# Patient Record
Sex: FEMALE | Race: WHITE | Employment: FULL TIME | URBAN - METROPOLITAN AREA
[De-identification: names, ages, dates, MRNs, and addresses within clinical notes are randomized per-mention and may not be internally consistent; named-entity substitution may affect disease eponyms.]

---
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
## 2021-05-30 VITALS — WEIGHT: 121.25 LBS | BODY MASS INDEX: 20.7 KG/M2 | HEIGHT: 64 IN

## 2021-06-08 NOTE — PROGRESS NOTES
Assessment/Plan:     Health maintenance female exam.  All questions answered.  Breast self exam technique reviewed and patient encouraged to perform self-exam monthly.  Discussed healthy lifestyle modifications.      She will follow up with one year or if there are concerns.  I did encourage her to call me if she decides she would like to start birth control pills we discussed the risks and benefits of those today.  She is not currently sexually active.        Subjective:      Galilea Gonzales is a 17 y.o. female who presents for an annual exam.  She is overall doing very well.  She is a senior in high school but is taking most of her classes at [employer] currently.  She plans on being a .  She will be going to college in [place] next year.    There are no diagnoses linked to this encounter.    Healthy Habits:   Regular Exercise: Yes  Sunscreen Use: Yes  Healthy Diet: Yes  Dental Visits Regularly: Yes  Seat Belt: Yes  Sexually active: No  Self Breast Exam Monthly:No  Colonoscopy: N/A  Prevention of Osteoporosis: N/A  Last Dexa: N/A    Immunization History   Administered Date(s) Administered     DTaP, historic 1999, 1999, 1999, 04/26/2000, 01/29/2004     HPV Quadrivalent 03/09/2010, 05/18/2010, 11/30/2010     Hep A, historic 02/21/2008, 11/01/2008     Hep B, historic 1999, 1999, 1999, 04/26/2000     HiB, historic 1999, 1999, 04/26/2000     IPV 1999, 1999, 01/31/2000, 03/19/2003     Influenza [id number], 11/11/2009     Influenza, inj, historic 11/08/2002, 12/09/2002, 12/04/2003, 10/23/2005, 10/20/2006, 10/25/2007, 11/01/2008, 11/27/2009, 11/30/2010, 01/16/2012     Influenza, seasonal,quad inj 6-35 mos 11/17/2012, 11/09/2013     Influenza,live, Nasal Laiv4 01/20/2015     Influenza,seasonal quad, PF, 36+MOS 01/29/2016     MMR 01/31/2000, 01/29/2004     Meningococcal MCV4P 03/09/2010, 01/29/2016     Rotavirus, historic 1999     Td,  "historic 03/09/2010     Tdap 03/09/2010     Varicella 01/31/2000, 02/21/2008     Immunization status: up to date and documented.    Gynecologic History  Patient's last menstrual period was 01/20/2017 (within days).  Contraception: abstinence      OB History   No data available       No current outpatient prescriptions on file.     No current facility-administered medications for this visit.      History reviewed. No pertinent past medical history.  History reviewed. No pertinent surgical history.  No known drug allergies  Family History   Problem Relation Age of Onset     No Medical Problems Mother      No Medical Problems Father      No Medical Problems Sister      No Medical Problems Brother      Lymphoma Maternal Grandfather      Social History     Social History     Marital status: Single     Spouse name: N/A     Number of children: N/A     Years of education: N/A     Occupational History     Not on file.     Social History Main Topics     Smoking status: Never Smoker     Smokeless tobacco: Not on file     Alcohol use No     Drug use: Not on file     Sexual activity: No     Other Topics Concern     Not on file     Social History Narrative       Review of Systems  General:  Denies problems  Eyes:  Denies problems  Ears/Nose/Throat:  Denies problems  Cardiovascular:  Denies problems  Respiratory:  Denies problems  Gastrointestinal:  Denies problems  Genitourinary:  Denies problems  Musculoskeletal:  Denies problems  Skin:  Denies problems, Neurologic:  Denies problems  Psychiatric:  Denies problems  Endocrine:  Denies problems  Heme/Lymphatic:  Denies problems  Allergic/Immunologic:  Denies problems       Objective:         Vitals:    02/10/17 1550   BP: 98/64   Pulse: 68   Resp: 16   Temp: 98.5  F (36.9  C)   TempSrc: Oral   Weight: 121 lb 4 oz (55 kg)   Height: 5' 4\" (1.626 m)       Physical Exam:  General Appearance: Alert, cooperative, no distress, appears stated age   Head: Normocephalic, without obvious " abnormality, atraumatic  Eyes: PERRL, conjunctiva/corneas clear, EOM's intact   Ears: Normal TM's and external ear canals, both ears  Nose:Nares normal, septum midline,mucosa normal, no drainage    Throat:Lips, mucosa, and tongue normal; teeth and gums normal  Neck: Supple, symmetrical, trachea midline, no adenopathy;  thyroid: not enlarged, symmetric, no tenderness/mass/nodules  Back: Symmetric, no curvature, ROM normal,  Lungs: Clear to auscultation bilaterally, respirations unlabored  Heart: Regular rate and rhythm, S1 and S2 normal, no murmur, rub, or gallop  Abdomen: Soft, non-tender, bowel sounds active all four quadrants,  no masses, no organomegaly  Pelvic: Deferred per patient preference  Extremities: Extremities normal, atraumatic, no cyanosis or edema  Skin: Skin color, texture, turgor normal, no rashes or lesions  Lymph nodes: Cervical, supraclavicular, and axillary nodes normal and   Neurologic: Normal

## 2024-01-27 ENCOUNTER — HOSPITAL ENCOUNTER (OUTPATIENT)
Facility: CLINIC | Age: 24
Setting detail: OBSERVATION
Discharge: HOME | End: 2024-01-28
Attending: EMERGENCY MEDICINE | Admitting: STUDENT IN AN ORGANIZED HEALTH CARE EDUCATION/TRAINING PROGRAM
Payer: OTHER MISCELLANEOUS

## 2024-01-27 ENCOUNTER — APPOINTMENT (OUTPATIENT)
Dept: GENERAL RADIOLOGY | Facility: CLINIC | Age: 24
End: 2024-01-27
Attending: EMERGENCY MEDICINE
Payer: OTHER MISCELLANEOUS
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
## 2024-01-27 DIAGNOSIS — J10.1 INFLUENZA A: ICD-10-CM

## 2024-01-27 DIAGNOSIS — S61.452D CAT BITE OF HAND, LEFT, SUBSEQUENT ENCOUNTER: ICD-10-CM

## 2024-01-27 LAB
ANION GAP SERPL CALCULATED.3IONS-SCNC: 8 MMOL/L (ref 7–15)
BASOPHILS # BLD AUTO: 0 10E3/UL (ref 0–0.2)
BASOPHILS NFR BLD AUTO: 1 %
BUN SERPL-MCNC: 8.9 MG/DL (ref 6–20)
CALCIUM SERPL-MCNC: 9.1 MG/DL (ref 8.6–10)
CHLORIDE SERPL-SCNC: 104 MMOL/L (ref 98–107)
CREAT SERPL-MCNC: 0.81 MG/DL (ref 0.51–0.95)
CRP SERPL-MCNC: 16.01 MG/L
DEPRECATED HCO3 PLAS-SCNC: 25 MMOL/L (ref 22–29)
EGFRCR SERPLBLD CKD-EPI 2021: >90 ML/MIN/1.73M2
EOSINOPHIL # BLD AUTO: 0.1 10E3/UL (ref 0–0.7)
EOSINOPHIL NFR BLD AUTO: 1 %
ERYTHROCYTE [DISTWIDTH] IN BLOOD BY AUTOMATED COUNT: 12.4 % (ref 10–15)
ERYTHROCYTE [SEDIMENTATION RATE] IN BLOOD BY WESTERGREN METHOD: 10 MM/HR (ref 0–20)
FLUAV RNA SPEC QL NAA+PROBE: POSITIVE
FLUBV RNA RESP QL NAA+PROBE: NEGATIVE
GLUCOSE SERPL-MCNC: 119 MG/DL (ref 70–99)
HCT VFR BLD AUTO: 39.5 % (ref 35–47)
HGB BLD-MCNC: 13.4 G/DL (ref 11.7–15.7)
IMM GRANULOCYTES # BLD: 0 10E3/UL
IMM GRANULOCYTES NFR BLD: 0 %
LYMPHOCYTES # BLD AUTO: 0.7 10E3/UL (ref 0.8–5.3)
LYMPHOCYTES NFR BLD AUTO: 15 %
MCH RBC QN AUTO: 30.5 PG (ref 26.5–33)
MCHC RBC AUTO-ENTMCNC: 33.9 G/DL (ref 31.5–36.5)
MCV RBC AUTO: 90 FL (ref 78–100)
MONOCYTES # BLD AUTO: 0.6 10E3/UL (ref 0–1.3)
MONOCYTES NFR BLD AUTO: 13 %
NEUTROPHILS # BLD AUTO: 3.1 10E3/UL (ref 1.6–8.3)
NEUTROPHILS NFR BLD AUTO: 70 %
NRBC # BLD AUTO: 0 10E3/UL
NRBC BLD AUTO-RTO: 0 /100
PLATELET # BLD AUTO: 235 10E3/UL (ref 150–450)
POTASSIUM SERPL-SCNC: 4.1 MMOL/L (ref 3.4–5.3)
RBC # BLD AUTO: 4.4 10E6/UL (ref 3.8–5.2)
RSV RNA SPEC NAA+PROBE: NEGATIVE
SARS-COV-2 RNA RESP QL NAA+PROBE: NEGATIVE
SODIUM SERPL-SCNC: 137 MMOL/L (ref 135–145)
WBC # BLD AUTO: 4.4 10E3/UL (ref 4–11)

## 2024-01-27 PROCEDURE — 80048 BASIC METABOLIC PNL TOTAL CA: CPT | Performed by: EMERGENCY MEDICINE

## 2024-01-27 PROCEDURE — G0378 HOSPITAL OBSERVATION PER HR: HCPCS

## 2024-01-27 PROCEDURE — 250N000013 HC RX MED GY IP 250 OP 250 PS 637: Performed by: EMERGENCY MEDICINE

## 2024-01-27 PROCEDURE — 85652 RBC SED RATE AUTOMATED: CPT | Performed by: EMERGENCY MEDICINE

## 2024-01-27 PROCEDURE — 250N000011 HC RX IP 250 OP 636: Performed by: EMERGENCY MEDICINE

## 2024-01-27 PROCEDURE — 96374 THER/PROPH/DIAG INJ IV PUSH: CPT

## 2024-01-27 PROCEDURE — 250N000011 HC RX IP 250 OP 636: Performed by: STUDENT IN AN ORGANIZED HEALTH CARE EDUCATION/TRAINING PROGRAM

## 2024-01-27 PROCEDURE — 99285 EMERGENCY DEPT VISIT HI MDM: CPT | Mod: 25

## 2024-01-27 PROCEDURE — 99223 1ST HOSP IP/OBS HIGH 75: CPT | Performed by: STUDENT IN AN ORGANIZED HEALTH CARE EDUCATION/TRAINING PROGRAM

## 2024-01-27 PROCEDURE — 86140 C-REACTIVE PROTEIN: CPT | Performed by: EMERGENCY MEDICINE

## 2024-01-27 PROCEDURE — 250N000013 HC RX MED GY IP 250 OP 250 PS 637: Performed by: STUDENT IN AN ORGANIZED HEALTH CARE EDUCATION/TRAINING PROGRAM

## 2024-01-27 PROCEDURE — 71046 X-RAY EXAM CHEST 2 VIEWS: CPT

## 2024-01-27 PROCEDURE — 96376 TX/PRO/DX INJ SAME DRUG ADON: CPT

## 2024-01-27 PROCEDURE — 85025 COMPLETE CBC W/AUTO DIFF WBC: CPT | Performed by: EMERGENCY MEDICINE

## 2024-01-27 PROCEDURE — 36415 COLL VENOUS BLD VENIPUNCTURE: CPT | Performed by: EMERGENCY MEDICINE

## 2024-01-27 PROCEDURE — 87637 SARSCOV2&INF A&B&RSV AMP PRB: CPT | Performed by: EMERGENCY MEDICINE

## 2024-01-27 RX ORDER — NALOXONE HYDROCHLORIDE 0.4 MG/ML
0.2 INJECTION, SOLUTION INTRAMUSCULAR; INTRAVENOUS; SUBCUTANEOUS
Status: DISCONTINUED | OUTPATIENT
Start: 2024-01-27 | End: 2024-01-28 | Stop reason: HOSPADM

## 2024-01-27 RX ORDER — AMOXICILLIN 250 MG
2 CAPSULE ORAL 2 TIMES DAILY PRN
Status: DISCONTINUED | OUTPATIENT
Start: 2024-01-27 | End: 2024-01-28 | Stop reason: HOSPADM

## 2024-01-27 RX ORDER — HYDROXYZINE HYDROCHLORIDE 25 MG/1
25-50 TABLET, FILM COATED ORAL 2 TIMES DAILY PRN
Status: DISCONTINUED | OUTPATIENT
Start: 2024-01-27 | End: 2024-01-28 | Stop reason: HOSPADM

## 2024-01-27 RX ORDER — OSELTAMIVIR PHOSPHATE 75 MG/1
75 CAPSULE ORAL ONCE
Status: COMPLETED | OUTPATIENT
Start: 2024-01-27 | End: 2024-01-27

## 2024-01-27 RX ORDER — AMPICILLIN AND SULBACTAM 2; 1 G/1; G/1
3 INJECTION, POWDER, FOR SOLUTION INTRAMUSCULAR; INTRAVENOUS EVERY 6 HOURS
Status: DISCONTINUED | OUTPATIENT
Start: 2024-01-27 | End: 2024-01-28 | Stop reason: HOSPADM

## 2024-01-27 RX ORDER — OXYCODONE HYDROCHLORIDE 5 MG/1
5 TABLET ORAL EVERY 4 HOURS PRN
Status: DISCONTINUED | OUTPATIENT
Start: 2024-01-27 | End: 2024-01-28 | Stop reason: HOSPADM

## 2024-01-27 RX ORDER — ACETAMINOPHEN 325 MG/1
650 TABLET ORAL EVERY 4 HOURS PRN
Status: DISCONTINUED | OUTPATIENT
Start: 2024-01-27 | End: 2024-01-28 | Stop reason: HOSPADM

## 2024-01-27 RX ORDER — OSELTAMIVIR PHOSPHATE 75 MG/1
75 CAPSULE ORAL 2 TIMES DAILY
Status: DISCONTINUED | OUTPATIENT
Start: 2024-01-27 | End: 2024-01-28 | Stop reason: HOSPADM

## 2024-01-27 RX ORDER — ESCITALOPRAM OXALATE 5 MG/1
5 TABLET ORAL DAILY
COMMUNITY

## 2024-01-27 RX ORDER — ACETAMINOPHEN 650 MG/1
650 SUPPOSITORY RECTAL EVERY 4 HOURS PRN
Status: DISCONTINUED | OUTPATIENT
Start: 2024-01-27 | End: 2024-01-28 | Stop reason: HOSPADM

## 2024-01-27 RX ORDER — ONDANSETRON 2 MG/ML
4 INJECTION INTRAMUSCULAR; INTRAVENOUS EVERY 6 HOURS PRN
Status: DISCONTINUED | OUTPATIENT
Start: 2024-01-27 | End: 2024-01-28 | Stop reason: HOSPADM

## 2024-01-27 RX ORDER — AMPICILLIN AND SULBACTAM 2; 1 G/1; G/1
3 INJECTION, POWDER, FOR SOLUTION INTRAMUSCULAR; INTRAVENOUS ONCE
Status: COMPLETED | OUTPATIENT
Start: 2024-01-27 | End: 2024-01-27

## 2024-01-27 RX ORDER — NALOXONE HYDROCHLORIDE 0.4 MG/ML
0.4 INJECTION, SOLUTION INTRAMUSCULAR; INTRAVENOUS; SUBCUTANEOUS
Status: DISCONTINUED | OUTPATIENT
Start: 2024-01-27 | End: 2024-01-28 | Stop reason: HOSPADM

## 2024-01-27 RX ORDER — AMOXICILLIN 250 MG
1 CAPSULE ORAL 2 TIMES DAILY PRN
Status: DISCONTINUED | OUTPATIENT
Start: 2024-01-27 | End: 2024-01-28 | Stop reason: HOSPADM

## 2024-01-27 RX ORDER — HYDROXYZINE HYDROCHLORIDE 25 MG/1
25-50 TABLET, FILM COATED ORAL 2 TIMES DAILY PRN
COMMUNITY

## 2024-01-27 RX ORDER — ESCITALOPRAM OXALATE 5 MG/1
5 TABLET ORAL DAILY
Status: DISCONTINUED | OUTPATIENT
Start: 2024-01-28 | End: 2024-01-28 | Stop reason: HOSPADM

## 2024-01-27 RX ORDER — ONDANSETRON 4 MG/1
4 TABLET, ORALLY DISINTEGRATING ORAL EVERY 6 HOURS PRN
Status: DISCONTINUED | OUTPATIENT
Start: 2024-01-27 | End: 2024-01-28 | Stop reason: HOSPADM

## 2024-01-27 RX ADMIN — OSELTAMIVIR PHOSPHATE 75 MG: 75 CAPSULE ORAL at 12:16

## 2024-01-27 RX ADMIN — AMPICILLIN SODIUM AND SULBACTAM SODIUM 3 G: 2; 1 INJECTION, POWDER, FOR SOLUTION INTRAMUSCULAR; INTRAVENOUS at 16:43

## 2024-01-27 RX ADMIN — AMPICILLIN SODIUM AND SULBACTAM SODIUM 3 G: 2; 1 INJECTION, POWDER, FOR SOLUTION INTRAMUSCULAR; INTRAVENOUS at 11:13

## 2024-01-27 RX ADMIN — OSELTAMIVIR PHOSPHATE 75 MG: 75 CAPSULE ORAL at 19:39

## 2024-01-27 RX ADMIN — ACETAMINOPHEN 650 MG: 325 TABLET, FILM COATED ORAL at 15:08

## 2024-01-27 RX ADMIN — AMPICILLIN SODIUM AND SULBACTAM SODIUM 3 G: 2; 1 INJECTION, POWDER, FOR SOLUTION INTRAMUSCULAR; INTRAVENOUS at 22:23

## 2024-01-27 ASSESSMENT — ACTIVITIES OF DAILY LIVING (ADL)
ADLS_ACUITY_SCORE: 20
ADLS_ACUITY_SCORE: 20
ADLS_ACUITY_SCORE: 35
ADLS_ACUITY_SCORE: 20
ADLS_ACUITY_SCORE: 35
ADLS_ACUITY_SCORE: 33

## 2024-01-27 NOTE — ED NOTES
[facility]  ED Nurse Handoff Report    ED Chief complaint: Cat Bite      ED Diagnosis:   Final diagnoses:   Cat bite of hand, left, subsequent encounter   Influenza A       Code Status: Full Code    Allergies:   Allergies   Allergen Reactions    No Known Drug Allergy Unknown       Patient Story:  Patient comes in with cat bite to her hand, she was started on antibiotics for it on Thursday but it is not getting better. Patient now has body aches. Of note patient tested positive for influenza A here.     Focused Assessment:    Pt A&Ox4. Breathing rate, rhythm and SPO2 WDL. Infrequent cough. Denies SOB. Denies chest pain. Endorses body aches. Left hand swollen and red.     Labs Ordered and Resulted from Time of ED Arrival to Time of ED Departure   BASIC METABOLIC PANEL - Abnormal       Result Value    Sodium 137      Potassium 4.1      Chloride 104      Carbon Dioxide (CO2) 25      Anion Gap 8      Urea Nitrogen 8.9      Creatinine 0.81      GFR Estimate >90      Calcium 9.1      Glucose 119 (*)    CRP INFLAMMATION - Abnormal    CRP Inflammation 16.01 (*)    INFLUENZA A/B, RSV, & SARS-COV2 PCR - Abnormal    Influenza A PCR Positive (*)     Influenza B PCR Negative      RSV PCR Negative      SARS CoV2 PCR Negative     CBC WITH PLATELETS AND DIFFERENTIAL - Abnormal    WBC Count 4.4      RBC Count 4.40      Hemoglobin 13.4      Hematocrit 39.5      MCV 90      MCH 30.5      MCHC 33.9      RDW 12.4      Platelet Count 235      % Neutrophils 70      % Lymphocytes 15      % Monocytes 13      % Eosinophils 1      % Basophils 1      % Immature Granulocytes 0      NRBCs per 100 WBC 0      Absolute Neutrophils 3.1      Absolute Lymphocytes 0.7 (*)     Absolute Monocytes 0.6      Absolute Eosinophils 0.1      Absolute Basophils 0.0      Absolute Immature Granulocytes 0.0      Absolute NRBCs 0.0     ERYTHROCYTE SEDIMENTATION RATE AUTO       Chest XR,  PA & LAT   Final Result   IMPRESSION: Negative chest.                Treatments and/or interventions provided:  Medications   ampicillin-sulbactam (UNASYN) 3 g vial to attach to  mL bag (3 g Intravenous $New Bag 1/27/24 1113)       Patient's response to treatments and/or interventions:  Patient remains stable.     To be done/followed up on inpatient unit:   See any in-patient orders. Iv ABX.     Does this patient have any cognitive concerns?:  N/A    Activity level - Baseline/Home:    Independent    Activity Level - Current:    Independent    Patient's Preferred language: English     Needed?: No    Isolation: None and Droplet  Infection: Not Applicable  Influenza  Patient tested for COVID 19 prior to admission: YES    Bariatric?: No    Vital Signs:   Vitals:    01/27/24 0832 01/27/24 0935 01/27/24 0936   BP: 112/67     Pulse: (!) 135  85   Resp: 20     Temp: 99.1  F (37.3  C)     TempSrc: Temporal     SpO2: 99% 97% 95%       Cardiac Rhythm:     Was the PSS-3 completed:   Yes  What interventions are required if any?                 Family Comments:  at bedside.    OBS brochure/video discussed/provided to patient/family: No              Name of person given brochure if not patient: N/A              Relationship to patient: N/A    For the majority of the shift this patient's behavior was Green.  Behavioral interventions performed were N/A.    ED NURSE PHONE NUMBER: *[phone]

## 2024-01-27 NOTE — ED PROVIDER NOTES
"    History     Chief Complaint:  Cat Bite       HPI   Galilea Gunn is a 24 year old female presenting with fever, cough, body aches for a cat bite on Thursday.  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  She then presented to urgent care, and was prescribed amoxicillin.  She states the redness and swelling of the left hand has worsened since starting the antibiotics.  She has now developed fever, sore throat, cough, body aches.  No chest pain or shortness of breath, nausea, vomiting, abdominal pain.      Independent Historian:    Patient only    Review of External Notes:  NA      Medications:    No current outpatient medications on file.      Past Medical History:    No past medical history on file.    Past Surgical History:    No past surgical history on file.       Physical Exam   Patient Vitals for the past 24 hrs:   BP Temp Temp src Pulse Resp SpO2 Height Weight   01/27/24 1343 126/78 100.3  F (37.9  C) Oral 100 20 98 % 1.626 m (5' 4\") 53.3 kg (117 lb 8.1 oz)   01/27/24 0936 -- -- -- 85 -- 95 % -- --   01/27/24 0935 -- -- -- -- -- 97 % -- --   01/27/24 0832 112/67 99.1  F (37.3  C) Temporal (!) 135 20 99 % -- --        Physical Exam  General: Resting on the bed.  Head: No obvious trauma to head.  Ears, Nose, Throat:  External ears normal.  Nose normal.  No pharyngeal erythema, swelling or exudate.  Midline uvula. Moist mucus membranes.  Eyes:  Conjunctivae clear.   Neck: Normal range of motion.  Neck supple.   CV: Regular rate and rhythm.  No murmurs.  Radial pulses 2+ bilaterally  Respiratory: Effort normal and breath sounds normal.  No wheezing or crackles.   Gastrointestinal: Soft.  No distension. There is no tenderness.  There is no rigidity, no rebound and no guarding.   Musculoskeletal: Normal range of motion.  Non tender extremities to palpations.    Neuro: Alert. Moving all extremities appropriately.  Normal speech.    Skin: Skin is warm and dry. 4 " puncture wounds to the dorsum of the left hand. No active hemorrhaging or drainage.  Surrounding area is mildly erythematous and swollen.  Psych: Normal mood and affect. Behavior is normal.       Emergency Department Course     Imaging:  Chest XR,  PA & LAT   Final Result   IMPRESSION: Negative chest.           Report per radiology    Laboratory:  Labs Ordered and Resulted from Time of ED Arrival to Time of ED Departure   BASIC METABOLIC PANEL - Abnormal       Result Value    Sodium 137      Potassium 4.1      Chloride 104      Carbon Dioxide (CO2) 25      Anion Gap 8      Urea Nitrogen 8.9      Creatinine 0.81      GFR Estimate >90      Calcium 9.1      Glucose 119 (*)    CRP INFLAMMATION - Abnormal    CRP Inflammation 16.01 (*)    INFLUENZA A/B, RSV, & SARS-COV2 PCR - Abnormal    Influenza A PCR Positive (*)     Influenza B PCR Negative      RSV PCR Negative      SARS CoV2 PCR Negative     CBC WITH PLATELETS AND DIFFERENTIAL - Abnormal    WBC Count 4.4      RBC Count 4.40      Hemoglobin 13.4      Hematocrit 39.5      MCV 90      MCH 30.5      MCHC 33.9      RDW 12.4      Platelet Count 235      % Neutrophils 70      % Lymphocytes 15      % Monocytes 13      % Eosinophils 1      % Basophils 1      % Immature Granulocytes 0      NRBCs per 100 WBC 0      Absolute Neutrophils 3.1      Absolute Lymphocytes 0.7 (*)     Absolute Monocytes 0.6      Absolute Eosinophils 0.1      Absolute Basophils 0.0      Absolute Immature Granulocytes 0.0      Absolute NRBCs 0.0     ERYTHROCYTE SEDIMENTATION RATE AUTO - Normal    Erythrocyte Sedimentation Rate 10          Procedures   NA    Emergency Department Course & Assessments:             Interventions:  Medications   escitalopram (LEXAPRO) tablet 5 mg (has no administration in time range)   hydrOXYzine HCl (ATARAX) tablet 25-50 mg (has no administration in time range)   senna-docusate (SENOKOT-S/PERICOLACE) 8.6-50 MG per tablet 1 tablet (has no administration in time range)      Or   senna-docusate (SENOKOT-S/PERICOLACE) 8.6-50 MG per tablet 2 tablet (has no administration in time range)   ondansetron (ZOFRAN ODT) ODT tab 4 mg (has no administration in time range)     Or   ondansetron (ZOFRAN) injection 4 mg (has no administration in time range)   acetaminophen (TYLENOL) tablet 650 mg (has no administration in time range)     Or   acetaminophen (TYLENOL) Suppository 650 mg (has no administration in time range)   oxyCODONE IR (ROXICODONE) half-tab 2.5 mg (has no administration in time range)   oxyCODONE (ROXICODONE) tablet 5 mg (has no administration in time range)   ampicillin-sulbactam (UNASYN) 3 g vial to attach to  mL bag (has no administration in time range)   naloxone (NARCAN) injection 0.2 mg (has no administration in time range)     Or   naloxone (NARCAN) injection 0.4 mg (has no administration in time range)     Or   naloxone (NARCAN) injection 0.2 mg (has no administration in time range)     Or   naloxone (NARCAN) injection 0.4 mg (has no administration in time range)   ampicillin-sulbactam (UNASYN) 3 g vial to attach to  mL bag (3 g Intravenous $New Bag 1/27/24 1113)   oseltamivir (TAMIFLU) capsule 75 mg (75 mg Oral $Given 1/27/24 1216)        Assessments:  0845 -initial evaluation and assessment of patient  1052 -I reevaluated the patient    Independent Interpretation (X-rays, CTs, rhythm strip):  Chest x-ray negative for consolidation, pleural effusion, pneumothorax    Consultations/Discussion of Management or Tests:  [id number] - I spoke to Dr. Taveras, hospitalist       Social Determinants of Health affecting care:  None     Disposition:  The patient was admitted to the hospital under the care of Dr. Taveras.     Impression & Plan    CMS Diagnoses: None        Medical Decision Making:  Patient presents with worsening redness and swelling to her left hand after a cat bite.  She has been on oral antibiotics, but is not improving.  She is now experiencing fever, cough.  I am  concerned for a viral upper respiratory infection.  She is positive for influenza A.  Chest x-ray is unremarkable.  CBC and BMP are also unremarkable.  CRP is elevated.  I had a discussion with the patient regarding her symptoms.  We do know that she has a viral URI, which is likely causing her to feel unwell.  However, it also appears that she is failing outpatient antibiotic therapy, since her Bite is no more erythematous and swollen.  She will be started on IV antibiotics.  Unasyn was started, and she is also given Tamiflu.  I discussed the patient with the hospitalist, who agreed to admit the patient to their service.  She remains in stable condition and is transferred to the floor.        Diagnosis:    ICD-10-CM    1. Cat bite of hand, left, subsequent encounter  S61.452D     W55.01XD       2. Influenza A  J10.1            Discharge Medications:  Current Discharge Medication List             1/27/2024   Ananth Quijano MD Peery, Stephen, MD  01/27/24 1517

## 2024-01-27 NOTE — PROGRESS NOTES
Diagnostic tests and consults completed and resulted  No  -vital signs normal or at patient baseline  No, low grade fever  -tolerating oral intake to maintain hydration Yes  -tolerating oral antibiotics or has plans for home infusion setup  NA  -returns to baseline functional status  Yes  -safe disposition plan has been identified Yes

## 2024-01-27 NOTE — ED TRIAGE NOTES
Left hand cat bite is now getting more painful and red after starting Amox on Thursday. Pt now has body aches.      Triage Assessment (Adult)       Row Name 01/27/24 0834          Triage Assessment    Airway WDL WDL        Respiratory WDL    Respiratory WDL WDL        Skin Circulation/Temperature WDL    Skin Circulation/Temperature WDL X  left hand cat bite        Cardiac WDL    Cardiac WDL WDL        Peripheral/Neurovascular WDL    Peripheral Neurovascular WDL WDL        Cognitive/Neuro/Behavioral WDL    Cognitive/Neuro/Behavioral WDL WDL

## 2024-01-27 NOTE — PHARMACY-ADMISSION MEDICATION HISTORY
Pharmacy Intern Admission Medication History    Admission medication history is complete. The information provided in this note is only as accurate as the sources available at the time of the update.    Information Source(s): Patient, Patient's pharmacy, and CareEverywhere/SureScripts via in-person    Pertinent Information: Patient is not sure what strength amoxicillin she has been taking since Thursday for a cat bite.     Changes made to PTA medication list:  Added: Amoxicillin, Lexapro, hydroxyzine  Deleted: None  Changed: None    Allergies reviewed with patient and updates made in EHR: yes    Medication History Completed By: Mayra Wallace 1/27/2024 11:29 AM    PTA Med List   Medication Sig Last Dose    AMOXICILLIN PO Take by mouth 4 times daily 1/27/2024    escitalopram (LEXAPRO) 5 MG tablet Take 5 mg by mouth daily 1/27/2024    hydrOXYzine HCl (ATARAX) 25 MG tablet Take 25-50 mg by mouth 2 times daily  at PRN

## 2024-01-27 NOTE — PROGRESS NOTES
RECEIVING UNIT ED HANDOFF REVIEW    ED Nurse Handoff Report was reviewed by: Fabrizio Townsend RN on January 27, 2024 at 1:25 PM

## 2024-01-27 NOTE — H&P
[facility]    History and Physical - Hospitalist Service       Date of Admission:  1/27/2024    Assessment & Plan      Galilea Gunn is a 24 year old female with no significant past medical history admitted on 1/27/2024 with cellulitis of the L had secondary to a cat bite as well as Influenza A.     Cellulitis   Cat bite to L hand   Pt presents to the ED after sustaining a cat bite to the left hand. She was seen in urgent care and was prescribed amoxicillin however the swelling and redness has continued to worsen.   * CRP elevated to 16.    - Started on Unasyn, continue  - Monitor redness and swelling.   - Trend CRP  - Currently, there does not appear to be a deep infection or abscess formation based on clinical exam. If things are not improved by 1/28 could consider imaging or orthopedic surgery consult.     Influenza A   Pt also tested positive for influenza A. She does report fever, cough, and body aches as well.  CXR is negative.   - Continue with Tamiflu       Observation Goals: -diagnostic tests and consults completed and resulted, -vital signs normal or at patient baseline, -tolerating oral intake to maintain hydration, -tolerating oral antibiotics or has plans for home infusion setup, -returns to baseline functional status, -safe disposition plan has been identified, Nurse to notify provider when observation goals have been met and patient is ready for discharge.  Diet: Regular Diet Adult    DVT Prophylaxis: Pneumatic Compression Devices  Michel Catheter: Not present  Lines: None     Cardiac Monitoring: None  Code Status: Full Code      Clinically Significant Risk Factors Present on Admission                                  Disposition Plan      Expected Discharge Date: 01/28/2024                  Nelsy Taveras MD  Hospitalist Service  [facility]  Securely message with [employer] Link (more info)  Text page via [employer] Paging/Directory      ______________________________________________________________________    Chief Complaint   Redness and swelling to the L hand     History is obtained from the patient    History of Present Illness   Galilea Gunn is a 24 year old female who presents to the ED with swelling and redness of her left hand following a cat bite. 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 She was seen in urgent care and was prescribed oral amoxicillin. She took 4 doses of the amoxicillin, but noticed that the swelling and redness around the bite was getting worse. The cat was reportedly up to date on rabies and all other vaccines. She also noted that she was generally not feeling well with body aches, generalized arthralgias, cough and rhinorrhea.     Past Medical History    No past medical history on file.    Past Surgical History   No past surgical history on file.    Prior to Admission Medications   Prior to Admission Medications   Prescriptions Last Dose Informant Patient Reported? Taking?   AMOXICILLIN PO 1/27/2024 at AM  Yes Yes   Sig: Take by mouth 4 times daily   escitalopram (LEXAPRO) 5 MG tablet 1/27/2024 at AM  Yes Yes   Sig: Take 5 mg by mouth daily   hydrOXYzine HCl (ATARAX) 25 MG tablet Unknown at PRN  Yes Yes   Sig: Take 25-50 mg by mouth 2 times daily as needed      Facility-Administered Medications: None        Review of Systems    The 10 point Review of Systems is negative other than noted in the HPI or here.     Physical Exam   Vital Signs: Temp: 100.3  F (37.9  C) Temp src: Oral BP: 126/78 Pulse: 100   Resp: 20 SpO2: 98 % O2 Device: None (Room air)    Weight: 117 lbs 8.08 oz    Constitutional: Awake, alert, cooperative, no apparent distress.  Eyes: Conjunctiva and pupils examined and normal.  HEENT: Moist mucous membranes, normal dentition.  Respiratory: Clear to auscultation bilaterally, no crackles or wheezing.  Cardiovascular: Regular rate and rhythm, normal S1 and S2, and no  murmur noted.  Skin: 4 puncture wounds on the posterior aspect of the left hand near the base of the thumb. There is surrounding erythema that is demarcated with a marker. There is no purulent drainage and she is able to fully range all of her fingers on that hand.   Musculoskeletal: No joint swelling, erythema or tenderness.  Neurologic: Cranial nerves 2-12 intact, normal strength and sensation.  Psychiatric: Alert, oriented to person, place and time, no obvious anxiety or depression.    Medical Decision Making       75 MINUTES SPENT BY ME on the date of service doing chart review, history, exam, documentation & further activities per the note.      Data     I have personally reviewed the following data over the past 24 hrs:    4.4  \   13.4   / 235     137 104 8.9 /  119 (H)   4.1 25 0.81 \     Procal: N/A CRP: 16.01 (H) Lactic Acid: N/A         Imaging results reviewed over the past 24 hrs:   Recent Results (from the past 24 hour(s))   Chest XR,  PA & LAT    Narrative    EXAM: XR CHEST 2 VIEWS  LOCATION: [facility]  DATE: 01/27/2024    INDICATION: Cough, fever.  COMPARISON: None.      Impression    IMPRESSION: Negative chest.

## 2024-01-28 VITALS
TEMPERATURE: 98.8 F | WEIGHT: 117.5 LBS | HEART RATE: 102 BPM | SYSTOLIC BLOOD PRESSURE: 112 MMHG | HEIGHT: 64 IN | BODY MASS INDEX: 20.06 KG/M2 | DIASTOLIC BLOOD PRESSURE: 71 MMHG | OXYGEN SATURATION: 97 % | RESPIRATION RATE: 18 BRPM

## 2024-01-28 LAB — CRP SERPL-MCNC: 15.35 MG/L

## 2024-01-28 PROCEDURE — 96376 TX/PRO/DX INJ SAME DRUG ADON: CPT

## 2024-01-28 PROCEDURE — 250N000011 HC RX IP 250 OP 636: Performed by: STUDENT IN AN ORGANIZED HEALTH CARE EDUCATION/TRAINING PROGRAM

## 2024-01-28 PROCEDURE — 250N000013 HC RX MED GY IP 250 OP 250 PS 637: Performed by: STUDENT IN AN ORGANIZED HEALTH CARE EDUCATION/TRAINING PROGRAM

## 2024-01-28 PROCEDURE — G0378 HOSPITAL OBSERVATION PER HR: HCPCS

## 2024-01-28 PROCEDURE — 99239 HOSP IP/OBS DSCHRG MGMT >30: CPT | Performed by: PHYSICIAN ASSISTANT

## 2024-01-28 PROCEDURE — 36415 COLL VENOUS BLD VENIPUNCTURE: CPT | Performed by: STUDENT IN AN ORGANIZED HEALTH CARE EDUCATION/TRAINING PROGRAM

## 2024-01-28 PROCEDURE — 86140 C-REACTIVE PROTEIN: CPT | Performed by: STUDENT IN AN ORGANIZED HEALTH CARE EDUCATION/TRAINING PROGRAM

## 2024-01-28 RX ORDER — OSELTAMIVIR PHOSPHATE 75 MG/1
75 CAPSULE ORAL 2 TIMES DAILY
Qty: 8 CAPSULE | Refills: 0 | Status: SHIPPED | OUTPATIENT
Start: 2024-01-28 | End: 2024-02-01

## 2024-01-28 RX ADMIN — AMPICILLIN SODIUM AND SULBACTAM SODIUM 3 G: 2; 1 INJECTION, POWDER, FOR SOLUTION INTRAMUSCULAR; INTRAVENOUS at 05:53

## 2024-01-28 RX ADMIN — ESCITALOPRAM OXALATE 5 MG: 5 TABLET, FILM COATED ORAL at 09:11

## 2024-01-28 RX ADMIN — OSELTAMIVIR PHOSPHATE 75 MG: 75 CAPSULE ORAL at 09:11

## 2024-01-28 ASSESSMENT — ACTIVITIES OF DAILY LIVING (ADL)
ADLS_ACUITY_SCORE: 20

## 2024-01-28 NOTE — PLAN OF CARE
Goal Outcome Evaluation:      Plan of Care Reviewed With: patient    Observation goals  PRIOR TO DISCHARGE        Comments:   -Diagnostic tests and consults completed and resulted-Met  -Vital signs normal or at patient baseline-Met  -Tolerating oral intake to maintain hydration-Met  -Tolerating oral antibiotics or has plans for home infusion setup-Met  -Returns to baseline functional status-Met  -Safe disposition plan has been identified-Met  Nurse to notify provider when observation goals have been met and patient is ready for discharge.

## 2024-01-28 NOTE — PLAN OF CARE
Patient has been discharged January 28, 2024 10:27 AM. Discharge instructions and education reviewed, medication schedule and follow up appts discussed. Pt had no questions. All belongings sent with pt. Patient discharged to ind home. Meds sent to pt's preferred pharmacy. PIV removed prior to departure

## 2024-01-28 NOTE — DISCHARGE SUMMARY
[facility]  Hospitalist Discharge Summary      Date of Admission:  1/27/2024  Date of Discharge:  1/28/2024  Discharging Provider: Koki Jensen PA-C  Discharge Service: Hospitalist Service    Discharge Diagnoses   Cat bite to left hand with associated cellulitis  Influenza A  Anxiety     Clinically Significant Risk Factors          Follow-ups Needed After Discharge   Follow-up Appointments     Follow-up and recommended labs and tests       Follow up with PCP this week for hospital follow up if any worsening            Discharge Disposition   Discharged to home  Condition at discharge: Stable    Hospital Course     Cellulitis   Cat bite to L hand   Pt presents to the ED after sustaining a cat bite to the left hand. She was seen in urgent care and was prescribed amoxicillin however the swelling and redness has continued to worsen.   * CRP elevated to 16, stable on recheck 15.3  *afebrile overnight   No sign of abscess/deep infection on exam   Significant improvement day of discharge   - Started on Unasyn during admission, transition to augmentin x7 days   -return precautions discussed     Influenza A   Pt also tested positive for influenza A. She does report fever, cough, and body aches as well.  CXR is negative. Oxygenating well on room air. Symptoms improving at time of discharge   - Continue with Tamiflu x5 days     Anxiety  Continue PTA Lexapro     Consultations This Hospital Stay   None    Code Status   Full Code    Time Spent on this Encounter   I, Koki Jensen PA-C, personally saw the patient today and spent greater than 30 minutes discharging this patient.       Koki Jensen PA-C  [facility] EXTENDED RECOVERY AND SHORT STAY  [address] [address]  Phone: [phone]  ______________________________________________________________________    Physical Exam   Temp: 98.8  F (37.1  C) Temp src: Oral BP: 112/71 Pulse: 102    Resp: 18 SpO2: 97 % O2 Device: None (Room air)    Vitals:    01/27/24 1343   Weight: 53.3 kg (117 lb 8.1 oz)     Vital Signs with Ranges  Temp:  [98.2  F (36.8  C)-100.3  F (37.9  C)] 98.8  F (37.1  C)  Pulse:  [] 102  Resp:  [16-20] 18  BP: (107-126)/(62-78) 112/71  SpO2:  [95 %-99 %] 97 %  No intake/output data recorded.    Constitutional: Alert and oriented, sitting up in bed. Appears comfortable and is appropriately conversant. Non toxic appearing   ENT:  moist mucous membranes  Respiratory: Lungs clear to auscultation bilaterally, no increased work of breathing  Cardiovascular: Regular rhythm with mild tachycardia   Skin/Integumen: minimal erythema has receded below previous borders. No significant edema. No drainage. No fluctuance or evidence of abscess    Primary Care Physician   Physician No Ref-Primary    Discharge Orders      Reason for your hospital stay    You were here for evaluation of an infected cat bite. You were also found to have influenza.     Follow-up and recommended labs and tests     Follow up with PCP this week for hospital follow up if any worsening     Activity    Your activity upon discharge: activity as tolerated     When to contact your care team    You should be re evaluated with worsening pain, swelling, drainage from the site or if you are feeling worse overall.     Diet    Follow this diet upon discharge: Orders Placed This Encounter      Regular Diet Adult       Significant Results and Procedures   Most Recent 3 CBC's:  Recent Labs   Lab Test 01/27/24  0931   WBC 4.4   HGB 13.4   MCV 90        Most Recent 3 BMP's:  Recent Labs   Lab Test 01/27/24  0931      POTASSIUM 4.1   CHLORIDE 104   CO2 25   BUN 8.9   CR 0.81   ANIONGAP 8   ANASTASIYA 9.1   *     Most Recent ESR & CRP:  Recent Labs   Lab Test 01/28/24  0553 01/27/24  0931   SED  --  10   CRPI 15.35* 16.01*   ,   Results for orders placed or performed during the hospital encounter of 01/27/24   Chest XR,  PA  & LAT    Narrative    EXAM: XR CHEST 2 VIEWS  LOCATION: [facility]  DATE: 01/27/2024    INDICATION: Cough, fever.  COMPARISON: None.      Impression    IMPRESSION: Negative chest.         Discharge Medications   Current Discharge Medication List        START taking these medications    Details   amoxicillin-clavulanate (AUGMENTIN) 875-125 MG tablet Take 1 tablet by mouth 2 times daily for 7 days  Qty: 14 tablet, Refills: 0    Associated Diagnoses: Cat bite of hand, left, subsequent encounter      oseltamivir (TAMIFLU) 75 MG capsule Take 1 capsule (75 mg) by mouth 2 times daily for 4 days  Qty: 8 capsule, Refills: 0    Associated Diagnoses: Influenza A           CONTINUE these medications which have NOT CHANGED    Details   escitalopram (LEXAPRO) 5 MG tablet Take 5 mg by mouth daily      hydrOXYzine HCl (ATARAX) 25 MG tablet Take 25-50 mg by mouth 2 times daily as needed           STOP taking these medications       AMOXICILLIN PO Comments:   Reason for Stopping:             Allergies   Allergies   Allergen Reactions    Banana Unknown    Shrimp Unknown

## 2024-01-28 NOTE — PLAN OF CARE
Goal Outcome Evaluation:      Plan of Care Reviewed With: patient    Observation goals  PRIOR TO DISCHARGE        Comments:   -Diagnostic tests and consults completed and resulted-Met  -Vital signs normal or at patient baseline-Met  -Tolerating oral intake to maintain hydration-Met  -Tolerating oral antibiotics or has plans for home infusion setup-Met  -Returns to baseline functional status-Met  -Safe disposition plan has been identified-Met  Nurse to notify provider when observation goals have been met and patient is ready for discharge.        PRIMARY Concern: Lt hand cellulitis following a cat bite  at work (), influenza A+  SAFETY RISK Concerns (fall risk, behaviors, etc.): none      Isolation/Type: Droplet precaution   Tests/Procedures for NEXT shift: recheck  CRP   Consults? (Pending/following, signed-off?) None  Where is patient from? (Home, TCU, etc.): Home  Other Important info for NEXT shift: None  Anticipated DC date & active delays: possibly today as cellulitis have markedly improved   _____________________________________________________  SUMMARY NOTE:  Orientation/Cognitive: A&O X4  Observation Goals (Met/ Not Met): Not met  Mobility Level/Assist Equipment: Ind  Antibiotics & Plan (IV/po, length of tx left): IV Unasyn Q 6hrs  Pain Management: denies pain, Tyl and oxy available  Tele/VS/O2: VSS on RA  ABNL Lab/BG:None this shift  Diet: Regular  Bowel/Bladder: Continent  Skin Concerns:  left hand cellulitis, borders marked, marked improvement noted  Drains/Devices: PIV SL  Patient Stated Goal for Today: to rest/ go home

## 2024-01-28 NOTE — PROGRESS NOTES
Diagnostic tests and consults completed and resulted  No  -vital signs normal or at patient baseline  Yes  -tolerating oral intake to maintain hydration Yes  -tolerating oral antibiotics or has plans for home infusion setup  NA  -returns to baseline functional status  Yes  -safe disposition plan has been identified Yes    Top portion must ALWAYS be completed  PRIMARY Concern: Cat bite to left hand at work( ), flu A+  SAFETY RISK Concerns (fall risk, behaviors, etc.): none      Isolation/Type: Droplet  Tests/Procedures for NEXT shift: trend CRP in the AM  Consults? (Pending/following, signed-off?) None  Where is patient from? (Home, TCU, etc.): Home  Other Important info for NEXT shift: None  Anticipated DC date & active delays: possibly tomorrow  _____________________________________________________________________________  SUMMARY NOTE:              (either paste note here OR complete the info below- RN to choose one- delete info below if not used)  Orientation/Cognitive: A&OX4  Observation Goals (Met/ Not Met): Not met  Mobility Level/Assist Equipment: Up ad britni  Antibiotics & Plan (IV/po, length of tx left):  IV Unasyn Q 6hrs  Pain Management: denies  Tele/VS/O2:  VSS on RA  ABNL Lab/BG: CRP up at  16.01  Diet: Regular  Bowel/Bladder: Continent  Skin Concerns:  left hand cellulitis, borders marked, improving  Drains/Devices: PIV Sled  Patient Stated Goal for Today: to rest

## 2024-01-28 NOTE — PROGRESS NOTES
PRIMARY Concern: Cat bite to left hand at work (), flu A+  SAFETY RISK Concerns (fall risk, behaviors, etc.): none      Isolation/Type: Droplet Influenza A  Tests/Procedures for NEXT shift: trend CRP in the AM  Consults? (Pending/following, signed-off?) None  Where is patient from? (Home, TCU, etc.): Home  Other Important info for NEXT shift: None  Anticipated DC date & active delays: possibly tomorrow  _____________________________________________________  SUMMARY NOTE:  Orientation/Cognitive: A&O X4  Observation Goals (Met/ Not Met): Not met  Mobility Level/Assist Equipment: Up ad britni, IND  Antibiotics & Plan (IV/po, length of tx left): IV Unasyn Q 6hrs  Pain Management: denies  Tele/VS/O2: VSS on RA  ABNL Lab/BG: CRP up at  16.01  Diet: Regular  Bowel/Bladder: Continent  Skin Concerns:  left hand cellulitis, borders marked, improving  Drains/Devices: PIV SL  Patient Stated Goal for Today: to rest